# Patient Record
Sex: MALE | Race: BLACK OR AFRICAN AMERICAN
[De-identification: names, ages, dates, MRNs, and addresses within clinical notes are randomized per-mention and may not be internally consistent; named-entity substitution may affect disease eponyms.]

---

## 2018-03-05 ENCOUNTER — HOSPITAL ENCOUNTER (OUTPATIENT)
Dept: HOSPITAL 62 - SP | Age: 33
End: 2018-03-05
Attending: INTERNAL MEDICINE
Payer: COMMERCIAL

## 2018-03-05 DIAGNOSIS — M79.661: Primary | ICD-10-CM

## 2018-03-05 DIAGNOSIS — Z53.20: ICD-10-CM

## 2018-03-07 ENCOUNTER — HOSPITAL ENCOUNTER (OUTPATIENT)
Dept: HOSPITAL 62 - OD | Age: 33
End: 2018-03-07
Attending: INTERNAL MEDICINE
Payer: COMMERCIAL

## 2018-03-07 DIAGNOSIS — M79.661: ICD-10-CM

## 2018-03-07 DIAGNOSIS — M25.561: Primary | ICD-10-CM

## 2023-06-28 NOTE — RADIOLOGY REPORT (SQ)
EXAM DESCRIPTION:  KNEE RIGHT 4 VIEWS



COMPLETED DATE/TIME:  3/7/2018 5:56 pm



REASON FOR STUDY:  PAIN IN RIGHT KNEE M25.561  PAIN IN RIGHT KNEE M79.661  PAIN IN RIGHT LOWER LEG



COMPARISON:  None.



NUMBER OF VIEWS:  Four views.



TECHNIQUE:  AP, lateral, and both oblique radiographic images acquired of the right knee.



LIMITATIONS:  None.



FINDINGS:  MINERALIZATION: Normal.

BONES: No acute fracture or dislocation.  No worrisome bone lesions.

JOINT: No effusion.

SOFT TISSUES: No soft tissue swelling.  No radio-opaque foreign body.

OTHER: No other significant finding.



IMPRESSION:  NEGATIVE STUDY OF THE RIGHT KNEE. NO RADIOGRAPHIC EVIDENCE OF ACUTE INJURY.



TECHNICAL DOCUMENTATION:  JOB ID:  [id number]

 2011 [employer]- All Rights Reserved



Reading location - IP/workstation name: [email or web address]
EXAM DESCRIPTION:  TIBIA FIBULA RIGHT



COMPLETED DATE/TIME:  3/7/2018 5:56 pm



REASON FOR STUDY:  PAIN IN RIGHT LOWER LEG M25.561  PAIN IN RIGHT KNEE M79.661  PAIN IN RIGHT LOWER L
EG



COMPARISON:  None.



NUMBER OF VIEWS:  Two views.



TECHNIQUE:  Two radiographic images acquired of the right tibia and fibula to include the knee and an
kle in at least one projection.



LIMITATIONS:  None.



FINDINGS:  MINERALIZATION: Normal.

BONES: No acute fracture or dislocation.  No worrisome bone lesions.

SOFT TISSUES: No obvious swelling or foreign body.

OTHER: No other significant finding.



IMPRESSION:  NEGATIVE STUDY OF THE RIGHT TIBIA AND FIBULA. NO RADIOGRAPHIC EVIDENCE OF ACUTE INJURY.



TECHNICAL DOCUMENTATION:  JOB ID:  [id number]

 2011 [employer]- All Rights Reserved



Reading location - IP/workstation name: [email or web address]
needed assist